# Patient Record
Sex: FEMALE | Race: WHITE | HISPANIC OR LATINO | Employment: FULL TIME | URBAN - METROPOLITAN AREA
[De-identification: names, ages, dates, MRNs, and addresses within clinical notes are randomized per-mention and may not be internally consistent; named-entity substitution may affect disease eponyms.]

---

## 2018-12-18 ENCOUNTER — HOSPITAL ENCOUNTER (OUTPATIENT)
Dept: LAB | Facility: MEDICAL CENTER | Age: 28
End: 2018-12-18
Attending: INTERNAL MEDICINE
Payer: MEDICAID

## 2018-12-18 ENCOUNTER — HOSPITAL ENCOUNTER (OUTPATIENT)
Dept: LAB | Facility: MEDICAL CENTER | Age: 28
End: 2018-12-18
Attending: INTERNAL MEDICINE

## 2018-12-18 LAB
BASOPHILS # BLD AUTO: 0.4 % (ref 0–1.8)
BASOPHILS # BLD: 0.02 K/UL (ref 0–0.12)
EOSINOPHIL # BLD AUTO: 0.08 K/UL (ref 0–0.51)
EOSINOPHIL NFR BLD: 1.4 % (ref 0–6.9)
ERYTHROCYTE [DISTWIDTH] IN BLOOD BY AUTOMATED COUNT: 43.4 FL (ref 35.9–50)
FORWARD REASON: SPWHY: NORMAL
FORWARDED TO LAB: SPWHR: NORMAL
HCT VFR BLD AUTO: 38.5 % (ref 37–47)
HGB BLD-MCNC: 12.9 G/DL (ref 12–16)
HIV 1+2 AB+HIV1 P24 AG SERPL QL IA: NON REACTIVE
IMM GRANULOCYTES # BLD AUTO: 0.01 K/UL (ref 0–0.11)
IMM GRANULOCYTES NFR BLD AUTO: 0.2 % (ref 0–0.9)
LYMPHOCYTES # BLD AUTO: 1.31 K/UL (ref 1–4.8)
LYMPHOCYTES NFR BLD: 22.9 % (ref 22–41)
MCH RBC QN AUTO: 31.3 PG (ref 27–33)
MCHC RBC AUTO-ENTMCNC: 33.5 G/DL (ref 33.6–35)
MCV RBC AUTO: 93.4 FL (ref 81.4–97.8)
MONOCYTES # BLD AUTO: 0.25 K/UL (ref 0–0.85)
MONOCYTES NFR BLD AUTO: 4.4 % (ref 0–13.4)
NEUTROPHILS # BLD AUTO: 4.04 K/UL (ref 2–7.15)
NEUTROPHILS NFR BLD: 70.7 % (ref 44–72)
NRBC # BLD AUTO: 0 K/UL
NRBC BLD-RTO: 0 /100 WBC
PLATELET # BLD AUTO: 165 K/UL (ref 164–446)
PMV BLD AUTO: 11.1 FL (ref 9–12.9)
RBC # BLD AUTO: 4.12 M/UL (ref 4.2–5.4)
SPECIMEN SENT (2ND): SPWT2: NORMAL
SPECIMEN SENT: SPWT1: NORMAL
WBC # BLD AUTO: 5.7 K/UL (ref 4.8–10.8)

## 2018-12-18 PROCEDURE — 87389 HIV-1 AG W/HIV-1&-2 AB AG IA: CPT

## 2018-12-18 PROCEDURE — 82043 UR ALBUMIN QUANTITATIVE: CPT

## 2018-12-18 PROCEDURE — 83945 ASSAY OF OXALATE: CPT

## 2018-12-18 PROCEDURE — 82570 ASSAY OF URINE CREATININE: CPT

## 2018-12-18 PROCEDURE — 82340 ASSAY OF CALCIUM IN URINE: CPT

## 2018-12-18 PROCEDURE — 84105 ASSAY OF URINE PHOSPHORUS: CPT

## 2018-12-18 PROCEDURE — 85025 COMPLETE CBC W/AUTO DIFF WBC: CPT

## 2018-12-19 LAB
CALCIUM 24H UR-MCNC: 4.5 MG/DL
CALCIUM 24H UR-MRATE: ABNORMAL MG/D
CALCIUM/CREAT 24H UR: 14 MG/G (ref 20–300)
COLLECT DURATION TIME SPEC: ABNORMAL HRS
COLLECT DURATION TIME SPEC: NORMAL HRS
CREAT 24H UR-MCNC: 328 MG/DL
CREAT 24H UR-MRATE: NORMAL MG/D (ref 700–1600)
PHOSPHATE 24H UR-MCNC: 145 MG/DL
PHOSPHATE 24H UR-MRATE: NORMAL MG/D (ref 400–1300)
PHOSPHATE/CREAT 24H UR: 442 MG/G
SPECIMEN VOL ?TM UR: ABNORMAL ML
TOTAL VOLUME 1105: NORMAL ML

## 2018-12-20 LAB
CREAT UR-MCNC: 420.4 MG/DL
CREAT UR-MCNC: 423.1 MG/DL
MICROALBUMIN UR-MCNC: 3.9 MG/DL
MICROALBUMIN/CREAT UR: 9 MG/G (ref 0–30)

## 2018-12-21 LAB
COLLECT DURATION TIME SPEC: NORMAL H
CREAT 24H UR-MCNC: 328 MG/DL
OXALATE 24H UR-MCNC: 43 MG/L
OXALATE 24H UR-MRATE: NORMAL MG/D (ref 13–40)
TOTAL VOLUME 1105: NORMAL ML

## 2019-09-25 ENCOUNTER — HOSPITAL ENCOUNTER (OUTPATIENT)
Dept: LAB | Facility: MEDICAL CENTER | Age: 29
End: 2019-09-25
Attending: INTERNAL MEDICINE

## 2019-09-25 PROCEDURE — 87591 N.GONORRHOEAE DNA AMP PROB: CPT

## 2019-09-25 PROCEDURE — 87491 CHLMYD TRACH DNA AMP PROBE: CPT

## 2019-09-25 PROCEDURE — 87341 HEP B SURFACE AG NEUTRLZJ IA: CPT

## 2019-09-26 LAB
C TRACH DNA SPEC QL NAA+PROBE: NEGATIVE
N GONORRHOEA DNA SPEC QL NAA+PROBE: NEGATIVE
SPECIMEN SOURCE: NORMAL

## 2019-09-28 LAB — HBV SURFACE AG SERPL QL NT: NORMAL

## 2020-03-14 ENCOUNTER — HOSPITAL ENCOUNTER (EMERGENCY)
Facility: MEDICAL CENTER | Age: 30
End: 2020-03-14
Attending: EMERGENCY MEDICINE

## 2020-03-14 VITALS
OXYGEN SATURATION: 99 % | SYSTOLIC BLOOD PRESSURE: 114 MMHG | WEIGHT: 130 LBS | RESPIRATION RATE: 16 BRPM | HEART RATE: 82 BPM | HEIGHT: 68 IN | BODY MASS INDEX: 19.7 KG/M2 | TEMPERATURE: 98.3 F | DIASTOLIC BLOOD PRESSURE: 71 MMHG

## 2020-03-14 DIAGNOSIS — J06.9 UPPER RESPIRATORY TRACT INFECTION, UNSPECIFIED TYPE: ICD-10-CM

## 2020-03-14 PROCEDURE — 99283 EMERGENCY DEPT VISIT LOW MDM: CPT

## 2020-03-14 ASSESSMENT — LIFESTYLE VARIABLES
HAVE PEOPLE ANNOYED YOU BY CRITICIZING YOUR DRINKING: NO
TOTAL SCORE: 0
DOES PATIENT WANT TO STOP DRINKING: NO
CONSUMPTION TOTAL: NEGATIVE
TOTAL SCORE: 0
HAVE YOU EVER FELT YOU SHOULD CUT DOWN ON YOUR DRINKING: NO
DO YOU DRINK ALCOHOL: NO
TOTAL SCORE: 0
AVERAGE NUMBER OF DAYS PER WEEK YOU HAVE A DRINK CONTAINING ALCOHOL: 0
ON A TYPICAL DAY WHEN YOU DRINK ALCOHOL HOW MANY DRINKS DO YOU HAVE: 0
EVER HAD A DRINK FIRST THING IN THE MORNING TO STEADY YOUR NERVES TO GET RID OF A HANGOVER: NO
HOW MANY TIMES IN THE PAST YEAR HAVE YOU HAD 5 OR MORE DRINKS IN A DAY: 0
EVER FELT BAD OR GUILTY ABOUT YOUR DRINKING: NO

## 2020-03-14 NOTE — ED PROVIDER NOTES
"ED Provider Note    Scribed for Adrián Alcantara M.D. by Charla Gallegos. 3/14/2020, 1:10 PM.    Primary care provider: Pcp Pt States None  Means of arrival: Walk-In  History obtained from: Patient  History limited by: None    CHIEF COMPLAINT  Chief Complaint   Patient presents with   • Sore Throat   • Cough       HPI  Kinsey Valerio is a 29 y.o. female who presents to the Emergency Department for sore throat, difficulty breathing, fatigue, and cough onset 1 weeks ago. Patient is additionally complaining of diarrhea that started earlier today. She denies any recent travel in the last 2 weeks. She denies any abdominal pain. No alleviating or exacerbating factors noted.     REVIEW OF SYSTEMS  See HPI above    PAST MEDICAL HISTORY   has a past medical history of Hemorrhage.    SURGICAL HISTORY   has a past surgical history that includes mammoplasty augmentation (3/4/2015).    SOCIAL HISTORY  Social History     Tobacco Use   • Smoking status: Never Smoker   Substance Use Topics   • Alcohol use: No   • Drug use: No      Social History     Substance and Sexual Activity   Drug Use No       FAMILY HISTORY  Family History   Problem Relation Age of Onset   • Diabetes Maternal Grandmother        CURRENT MEDICATIONS  Current Outpatient Medications:   •  hydrocodone-acetaminophen (NORCO) 5-325 MG Tab per tablet, Take 1-2 Tabs by mouth every four hours as needed., Disp: 16 Tab, Rfl: 0      ALLERGIES  Allergies   Allergen Reactions   • Nkda [No Known Drug Allergy]        PHYSICAL EXAM  VITAL SIGNS: /74   Pulse 91   Temp 36.8 °C (98.3 °F) (Oral)   Resp 16   Ht 1.727 m (5' 8\")   Wt 59 kg (130 lb)   SpO2 99%   BMI 19.77 kg/m²     Constitutional: Well developed, Well nourished, No acute distress, Non-toxic appearance.   HENT: Normocephalic, Atraumatic, Bilateral external ears normal.  Eyes: conjunctiva is normal. There are no signs of exudate.  Neck: Supple.   Cardiovascular: Regular rate and rhythm without " murmurs gallops or rubs.   Thorax & Lungs: No respiratory distress. Breathing comfortably. Lungs are clear to auscultation bilaterally, there are no wheezes no rales. Chest wall is nontender.  Abdomen: Soft, nontender nondistended.  Skin: Warm, Dry, No erythema,   Back: No tenderness, No CVA tenderness.  Musculoskeletal: Good range of motion in all major joints. No tenderness to palpation or major deformities noted. Intact distal pulses, no clubbing, no cyanosis, no edema,   Neurologic: Alert & oriented x 3, Normal motor function, Normal sensory function, No focal deficits noted.   Psychiatric: Affect normal, Judgment normal, Mood normal.     COURSE & MEDICAL DECISION MAKING  Nursing notes, VS, PMSFHx reviewed in chart.    1:10 PM - Patient seen and examined at bedside. The patient presents today with signs and symptoms consistent with a viral upper respiratory infection. They have a normal pulse oximetry on room air and a normal pulmonary exam. Therefore, I feel that the likelihood of pneumonia is low. This patient does not demonstrate any clinical evidence of pneumonia, meningitis, appendicitis, or other acute medical emergency. Overall, the patient is very well appearing. I do not feel that this patient would benefit from antibiotics at this time. I have recommended Tylenol and/or ibuprofen for fever. Patient has had no contact with high risk patient for COVID-19 and at this point I feel it is different viral infection. I recommended patient voluntarily self isolate once discharged home. She is comfortable with this plan and will return for any new or worsening symptoms.         The patient will return for new or worsening symptoms and is stable at the time of discharge.    DISPOSITION:  Patient will be discharged home in stable condition.    FOLLOW UP:  Your PCP            OUTPATIENT MEDICATIONS:  Discharge Medication List as of 3/14/2020  1:30 PM            FINAL IMPRESSION  1. Upper respiratory tract  infection, unspecified type          I, Charla Gallegos (Alexandro), am scribing for, and in the presence of, Adrián Alcantara M.D..    Electronically signed by: Charla Gallegos (Alexandro), 3/14/2020    I, Adrián Alcantara M.D. personally performed the services described in this documentation, as scribed by Charla Gallegos in my presence, and it is both accurate and complete. E.    The note accurately reflects work and decisions made by me.  Adrián Alcantara M.D.  3/14/2020  2:49 PM